# Patient Record
Sex: FEMALE | Race: WHITE
[De-identification: names, ages, dates, MRNs, and addresses within clinical notes are randomized per-mention and may not be internally consistent; named-entity substitution may affect disease eponyms.]

---

## 2017-12-05 ENCOUNTER — HOSPITAL ENCOUNTER (EMERGENCY)
Dept: HOSPITAL 25 - ED | Age: 47
Discharge: HOME | End: 2017-12-05
Payer: COMMERCIAL

## 2017-12-05 VITALS — SYSTOLIC BLOOD PRESSURE: 132 MMHG | DIASTOLIC BLOOD PRESSURE: 89 MMHG

## 2017-12-05 DIAGNOSIS — E11.9: ICD-10-CM

## 2017-12-05 DIAGNOSIS — I50.9: ICD-10-CM

## 2017-12-05 DIAGNOSIS — I10: ICD-10-CM
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
## 2017-12-05 DIAGNOSIS — M17.12: ICD-10-CM

## 2017-12-05 DIAGNOSIS — S83.92XA: Primary | ICD-10-CM
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
## 2017-12-05 PROCEDURE — 96372 THER/PROPH/DIAG INJ SC/IM: CPT

## 2017-12-05 PROCEDURE — 99282 EMERGENCY DEPT VISIT SF MDM: CPT

## 2017-12-05 NOTE — ED
Lower Extremity





- History of Current Complaint


Chief Complaint: EDExtremityLower


Stated Complaint: LEFT KNEE PAIN


Time Seen by Provider: 12/05/17 14:15


Pain Intensity: 6





- Allergies/Home Medications


Allergies/Adverse Reactions: 


 Allergies











Allergy/AdvReac Type Severity Reaction Status Date / Time


 


Erythromycin Allergy  RASH, Verified 12/05/17 13:29





   VOMITING  


 


Sulfa Drugs Allergy  Rash Verified 12/05/17 13:29


 


Tetracycline Allergy  RASH, Verified 12/05/17 13:29





   VOMITING  














PMH/Surg Hx/FS Hx/Imm Hx


Endocrine/Hematology History: Reports: Hx Diabetes - TYPE 2


Cardiovascular History: Reports: Hx Congestive Heart Failure - HISTORY OF R/T 

JANUVIA INTAKE, Hx Hypertension - CONTROL WITH MEDS


Respiratory History: 


   Comment Only: Hx Sleep Apnea - NOT DIAGNOSISED


Musculoskeletal History: Reports: Hx Arthritis - BILATERAL THUMBS


Sensory History: Reports: Hx Contacts or Glasses - DISTANCE GLASSES


   Denies: Hx Hearing Aid


Opthamlomology History: Reports: Hx Contacts or Glasses - DISTANCE GLASSES


Neurological History: Reports: Hx Headaches - SINUS HEADACHES





- Cancer History


Hx Chemotherapy: No


Hx Radiation Therapy: No





- Surgical History


Surgery Procedure, Year, and Place: TONSILLECTOMY AS A CHILD,.  1993 [facility] 

BUNINECTOMY LEFT FOOT, [place], [place]


Hx Anesthesia Reactions: No





- Immunization History


Immunizations Up to Date: Yes


Infectious Disease History: No


Infectious Disease History: 


   Denies: Traveled Outside the [place] in Last 30 Days





- Social History


Alcohol Use: Occasionally


Substance Use Type: Reports: None


Smoking Status (MU): Never Smoked Tobacco





Physical Exam


Vital Signs On Initial Exam: 


 Initial Vitals











Temp Pulse Resp BP Pulse Ox


 


 97.8 F   106   16   129/91   98 


 


 12/05/17 13:29  12/05/17 13:29  12/05/17 13:29  12/05/17 13:29  12/05/17 13:29














- [place] Coma Scale


Coma Scale Total: 15





Diagnostics





- Vital Signs


 Vital Signs











  Temp Pulse Resp BP Pulse Ox


 


 12/05/17 13:29  97.8 F  106  16  129/91  98














- Laboratory


Lab Statement: Any lab studies that have been ordered have been reviewed, and 

results considered in the medical decision making process.





Lower Extremity Course/Dx





- Diagnoses


Provider Diagnoses: 


 Left knee sprain








Discharge





- Discharge Plan


Condition: Stable


Disposition: HOME


Patient Education Materials:  Knee Sprain (ED)


Referrals: 


Rebecca Hernandez NP [Primary Care Provider] - 


Additional Instructions: 


Rest, ice, elevate


Use crutches to ambulate with limited weight bearing  - advance as tolerated


Ibuprofen 600mg every 6 hours with food for pain - you may alternate with 

acetaminophen 650mg every 6 hours


Follow-up with PCP in 1-2 weeks


*If you develop numbness, tingling, weakness, return to ED

## 2017-12-05 NOTE — RAD
HISTORY: Anterior knee pain



COMPARISONS: None



VIEWS: 2, Frontal and lateral views of the left knee



FINDINGS:



BONE DENSITY: Normal.

BONES: There is no displaced fracture.    

JOINTS: There is mild medial and lateral compartment joint space narrowing. There is

patellofemoral joint space narrowing and osteophyte formation. There is a small

suprapatellar joint effusion.    

ALIGNMENT: There is no dislocation. 

SOFT TISSUES: Unremarkable.



OTHER FINDINGS: None.



IMPRESSION: 

MODERATE OSTEOARTHRITIS WITH A SMALL SUPRAPATELLAR JOINT EFFUSION.

## 2019-10-14 ENCOUNTER — HOSPITAL ENCOUNTER (EMERGENCY)
Dept: HOSPITAL 25 - ED | Age: 49
Discharge: HOME | End: 2019-10-14
Payer: COMMERCIAL

## 2019-10-14 VITALS — SYSTOLIC BLOOD PRESSURE: 143 MMHG | DIASTOLIC BLOOD PRESSURE: 87 MMHG

## 2019-10-14 DIAGNOSIS — Z88.2: ICD-10-CM

## 2019-10-14 DIAGNOSIS — M25.512: Primary | ICD-10-CM

## 2019-10-14 DIAGNOSIS — I50.9: ICD-10-CM

## 2019-10-14 DIAGNOSIS — E11.9: ICD-10-CM

## 2019-10-14 DIAGNOSIS — Z88.1: ICD-10-CM

## 2019-10-14 DIAGNOSIS — I11.0: ICD-10-CM

## 2019-10-14 PROCEDURE — 96372 THER/PROPH/DIAG INJ SC/IM: CPT

## 2019-10-14 PROCEDURE — 99281 EMR DPT VST MAYX REQ PHY/QHP: CPT

## 2019-10-14 NOTE — ED
Upper Extremity Pain





- HPI Summary


HPI Summary: 





Patient complains of left shoulder pain 2 days.  Denies trauma, states history 

of repetitive use getting in and out of the car as she is a .

  Denies any other pain, injury or symptoms.





- History of Current Complaint


Chief Complaint: EDExtremityUpper


Stated Complaint: LT SHOULDER PAIN PER PT


Time Seen by Provider: 10/14/19 15:58


Hx Obtained From: Patient


Mechanism Of Injury: Unknown


Onset/Duration: Started Days Ago


Timing: Constant


Severity Initially: Severe


Severity Currently: Severe


Pain Location: Shoulder


Character: Aching, Throbbing


Aggravating Factor(s): Movement


Alleviating Factor(s): Nothing


Associated Signs & Symptoms: Positive: Negative





- Allergies/Home Medications


Allergies/Adverse Reactions: 


 Allergies











Allergy/AdvReac Type Severity Reaction Status Date / Time


 


MS Erythromycin Allergy  RASH, Verified 05/08/19 14:49





   VOMITING  


 


MS Sulfa Drugs Allergy  Rash Verified 05/08/19 14:49


 


MS Tetracycline Allergy  RASH, Verified 05/08/19 14:49





   VOMITING  














PMH/Surg Hx/FS Hx/Imm Hx


Endocrine/Hematology History: Reports: Hx Diabetes - TYPE 2


   Denies: Hx Anticoagulant Therapy, Hx Blood Disorders, Hx Unexplained Bleeding


Cardiovascular History: Reports: Hx Congestive Heart Failure - HISTORY OF R/T 

JANUVIA INTAKE, Hx Hypertension - CONTROL WITH MEDS


Respiratory History: 


   Comment Only: Hx Sleep Apnea - NOT DIAGNOSISED


 History: 


   Denies: Hx Dialysis


Musculoskeletal History: Reports: Hx Arthritis - BILATERAL THUMBS


Sensory History: Reports: Hx Contacts or Glasses - DISTANCE GLASSES


   Denies: Hx Hearing Aid


Opthamlomology History: Reports: Hx Contacts or Glasses - DISTANCE GLASSES


EENT History: 


   Denies: Hx Deafness


Neurological History: Reports: Hx Headaches - SINUS HEADACHES





- Cancer History


Hx Chemotherapy: No


Hx Radiation Therapy: No





- Surgical History


Surgery Procedure, Year, and Place: TONSILLECTOMY AS A CHILD,.  1993 [place] 

BUNINECTOMY LEFT FOOT, [place], [place]


Hx Anesthesia Reactions: No


Infectious Disease History: No


Infectious Disease History: 


   Denies: Traveled Outside the [place] in Last 30 Days





- Family History


Known Family History: Positive: None





- Social History


Alcohol Use: Occasionally


Hx Substance Use: No


Substance Use Type: Reports: None


Hx Tobacco Use: No


Smoking Status (MU): Never Smoked Tobacco





Review of Systems


Constitutional: Negative


Eyes: Negative


ENT: Negative


Cardiovascular: Negative


Respiratory: Negative


Gastrointestinal: Negative


Genitourinary: Negative


Musculoskeletal: Other


Skin: Negative


Neurological: Negative


Psychological: Normal


All Other Systems Reviewed And Are Negative: Yes





Physical Exam





- Summary


Physical Exam Summary: 





Patient has full range of motion with moderate pain.  Tenderness to palpation 

along anterior shoulder and triceps.  Mild swelling to triceps.  Flexion and 

extension against resistance intact at the elbow.  PMS intact distally.


Triage Information Reviewed: Yes


Vital Signs On Initial Exam: 


 Initial Vitals











Temp Pulse Resp BP Pulse Ox


 


 96.9 F   103   16   160/97   98 


 


 10/14/19 15:53  10/14/19 15:53  10/14/19 15:53  10/14/19 15:53  10/14/19 15:53











Vital Signs Reviewed: Yes


Appearance: Positive: Well-Appearing


Skin: Positive: Warm


Head/Face: Positive: Normal Head/Face Inspection


Eyes: Positive: Normal


ENT: Positive: Normal ENT inspection


Neck: Positive: Supple


Respiratory/Lung Sounds: Positive: Clear to Auscultation


Cardiovascular: Positive: Normal


Abdomen Description: Positive: Nontender


Musculoskeletal: Positive: Normal


Neurological: Positive: Normal


Psychiatric: Positive: Normal


AVPU Assessment: Alert





- [place] Coma Scale


Best Eye Response: 4 - Spontaneous


Best Motor Response: 6 - Obeys Commands


Best Verbal Response: 5 - Oriented


Coma Scale Total: 15





Procedures





- Sedation


Patient Received Moderate/Deep Sedation with Procedure: No





Diagnostics





- Vital Signs


 Vital Signs











  Temp Pulse Resp BP Pulse Ox


 


 10/14/19 15:53  96.9 F  103  16  160/97  98














- Laboratory


Lab Statement: Any lab studies that have been ordered have been reviewed, and 

results considered in the medical decision making process.





Course/Dx





- Course


Course Of Treatment: Patient complains of left shoulder pain 2 days.  Denies 

trauma, states history of repetitive use getting in and out of the car as she 

is a .  Denies any other pain, injury or symptoms.  Vital 

signs within normal limits.  X-ray shoulder negative for fracture, positive for 

osteoarthritis.  Patient advised to follow-up with orthopedics for further 

evaluation.  Patient understands and approves of plan.





- Diagnoses


Provider Diagnoses: 


 Shoulder pain, left








Discharge ED





- Sign-Out/Discharge


Documenting (check all that apply): Patient Departure





- Discharge Plan


Condition: Stable


Disposition: HOME


Prescriptions: 


Lidocaine PATCH 5%* [Lidoderm 5% Patch*] 1 patch TRANSDERM DAILY 5 Days #5 patch


Patient Education Materials:  Shoulder Pain (ED)


Referrals: 


Gwen Domingo NP [Primary Care Provider] - 


Katey Dailey MD [Medical Doctor] - 


Additional Instructions: 


Alternate ibuprofen 600 mg with Tylenol 650 mg every 3 hours as needed for 

shoulder pain.  Follow-up with orthopedics Dr. Dailey for further evaluation.





- Billing Disposition and Condition


Condition: STABLE


Disposition: Home